# Patient Record
Sex: FEMALE | Race: BLACK OR AFRICAN AMERICAN | NOT HISPANIC OR LATINO | Employment: FULL TIME | URBAN - METROPOLITAN AREA
[De-identification: names, ages, dates, MRNs, and addresses within clinical notes are randomized per-mention and may not be internally consistent; named-entity substitution may affect disease eponyms.]

---

## 2023-04-19 ENCOUNTER — APPOINTMENT (OUTPATIENT)
Dept: LAB | Facility: LAB | Age: 53
End: 2023-04-19
Payer: COMMERCIAL

## 2023-04-19 LAB
ALBUMIN (G/DL) IN SER/PLAS: 4.3 G/DL (ref 3.4–5)
ANION GAP IN SER/PLAS: 13 MMOL/L (ref 10–20)
CALCIUM (MG/DL) IN SER/PLAS: 10 MG/DL (ref 8.6–10.6)
CARBON DIOXIDE, TOTAL (MMOL/L) IN SER/PLAS: 27 MMOL/L (ref 21–32)
CHLORIDE (MMOL/L) IN SER/PLAS: 109 MMOL/L (ref 98–107)
CREATININE (MG/DL) IN SER/PLAS: 0.81 MG/DL (ref 0.5–1.05)
ERYTHROCYTE DISTRIBUTION WIDTH (RATIO) BY AUTOMATED COUNT: 13.9 % (ref 11.5–14.5)
ERYTHROCYTE MEAN CORPUSCULAR HEMOGLOBIN CONCENTRATION (G/DL) BY AUTOMATED: 32 G/DL (ref 32–36)
ERYTHROCYTE MEAN CORPUSCULAR VOLUME (FL) BY AUTOMATED COUNT: 84 FL (ref 80–100)
ERYTHROCYTES (10*6/UL) IN BLOOD BY AUTOMATED COUNT: 5.53 X10E12/L (ref 4–5.2)
FERRITIN (UG/LL) IN SER/PLAS: 72 UG/L (ref 8–150)
GFR FEMALE: 86 ML/MIN/1.73M2
GLUCOSE (MG/DL) IN SER/PLAS: 87 MG/DL (ref 74–99)
HEMATOCRIT (%) IN BLOOD BY AUTOMATED COUNT: 46.5 % (ref 36–46)
HEMOGLOBIN (G/DL) IN BLOOD: 14.9 G/DL (ref 12–16)
IRON (UG/DL) IN SER/PLAS: 74 UG/DL (ref 35–150)
IRON BINDING CAPACITY (UG/DL) IN SER/PLAS: 339 UG/DL (ref 240–445)
IRON SATURATION (%) IN SER/PLAS: 22 % (ref 25–45)
LEUKOCYTES (10*3/UL) IN BLOOD BY AUTOMATED COUNT: 13.5 X10E9/L (ref 4.4–11.3)
NATRIURETIC PEPTIDE B (PG/ML) IN SER/PLAS: 25 PG/ML (ref 0–99)
NRBC (PER 100 WBCS) BY AUTOMATED COUNT: 0 /100 WBC (ref 0–0)
PHOSPHATE (MG/DL) IN SER/PLAS: 4.4 MG/DL (ref 2.5–4.9)
PLATELETS (10*3/UL) IN BLOOD AUTOMATED COUNT: 336 X10E9/L (ref 150–450)
POTASSIUM (MMOL/L) IN SER/PLAS: 4.6 MMOL/L (ref 3.5–5.3)
SODIUM (MMOL/L) IN SER/PLAS: 144 MMOL/L (ref 136–145)
UREA NITROGEN (MG/DL) IN SER/PLAS: 19 MG/DL (ref 6–23)

## 2023-10-09 ENCOUNTER — PHARMACY VISIT (OUTPATIENT)
Dept: PHARMACY | Facility: CLINIC | Age: 53
End: 2023-10-09
Payer: COMMERCIAL

## 2023-10-09 DIAGNOSIS — I50.30 HEART FAILURE WITH PRESERVED EJECTION FRACTION, UNSPECIFIED HF CHRONICITY (MULTI): Primary | ICD-10-CM

## 2023-10-11 ENCOUNTER — PHARMACY VISIT (OUTPATIENT)
Dept: PHARMACY | Facility: CLINIC | Age: 53
End: 2023-10-11
Payer: COMMERCIAL

## 2023-10-11 RX ORDER — CARVEDILOL 25 MG/1
25 TABLET ORAL 2 TIMES DAILY
Qty: 60 TABLET | Refills: 3 | Status: SHIPPED | OUTPATIENT
Start: 2023-10-11 | End: 2024-03-11 | Stop reason: SDUPTHER

## 2023-11-13 ENCOUNTER — PHARMACY VISIT (OUTPATIENT)
Dept: PHARMACY | Facility: CLINIC | Age: 53
End: 2023-11-13
Payer: COMMERCIAL

## 2023-11-13 PROCEDURE — RXMED WILLOW AMBULATORY MEDICATION CHARGE

## 2023-11-21 ENCOUNTER — TELEPHONE (OUTPATIENT)
Dept: OBSTETRICS AND GYNECOLOGY | Facility: HOSPITAL | Age: 53
End: 2023-11-21
Payer: COMMERCIAL

## 2023-11-21 DIAGNOSIS — R92.8 ABNORMAL MAMMOGRAM: Primary | ICD-10-CM

## 2023-11-21 NOTE — TELEPHONE ENCOUNTER
Received call from radiology tech.   Patient needs order for 6 month flow up right breast US.   Order pended and sent to provider to sign.

## 2023-11-29 ENCOUNTER — HOSPITAL ENCOUNTER (OUTPATIENT)
Dept: RADIOLOGY | Facility: HOSPITAL | Age: 53
Discharge: HOME | End: 2023-11-29
Payer: COMMERCIAL

## 2023-11-29 DIAGNOSIS — R92.8 ABNORMAL MAMMOGRAM: ICD-10-CM

## 2023-11-29 PROCEDURE — 76642 ULTRASOUND BREAST LIMITED: CPT | Mod: RT

## 2023-11-29 PROCEDURE — 76642 ULTRASOUND BREAST LIMITED: CPT | Mod: RIGHT SIDE | Performed by: RADIOLOGY

## 2023-11-29 PROCEDURE — 76982 USE 1ST TARGET LESION: CPT | Mod: RT

## 2023-12-08 DIAGNOSIS — R92.8 ABNORMAL MAMMOGRAM: Primary | ICD-10-CM

## 2023-12-08 PROCEDURE — RXMED WILLOW AMBULATORY MEDICATION CHARGE

## 2023-12-12 ENCOUNTER — TELEPHONE (OUTPATIENT)
Dept: OBSTETRICS AND GYNECOLOGY | Facility: CLINIC | Age: 53
End: 2023-12-12
Payer: COMMERCIAL

## 2023-12-12 NOTE — TELEPHONE ENCOUNTER
Called pt, no answer, left voicemail for return call  Left message explaining that the referral is to the breast center not to the actual breast surgeon

## 2023-12-12 NOTE — TELEPHONE ENCOUNTER
----- Message from Desean Murguia sent at 12/12/2023  9:44 AM EST -----  Regarding: breast ultrasound  Contact: [phone]  Hi do you know why I'm being  referred to breast surgeon.  thanks

## 2023-12-13 ENCOUNTER — TELEPHONE (OUTPATIENT)
Dept: OBSTETRICS AND GYNECOLOGY | Facility: HOSPITAL | Age: 53
End: 2023-12-13
Payer: COMMERCIAL

## 2023-12-13 NOTE — TELEPHONE ENCOUNTER
Verified patient by name and .   Returning patient's call  Patient had questions regarding referral to breast surgeon.  Discussed with patient this is due to her mammogram showing right breast mass and needing continual follow up.  Educated the patient that anytime someone has more complex breast finding on mammogram their provider who ordered mammogram will refer to breast center/surgeon.  Patient verbalized understanding and all questions and concerns addressed.

## 2023-12-18 ENCOUNTER — PHARMACY VISIT (OUTPATIENT)
Dept: PHARMACY | Facility: CLINIC | Age: 53
End: 2023-12-18
Payer: COMMERCIAL

## 2023-12-20 ENCOUNTER — APPOINTMENT (OUTPATIENT)
Dept: SURGICAL ONCOLOGY | Facility: CLINIC | Age: 53
End: 2023-12-20
Payer: COMMERCIAL

## 2024-01-02 PROBLEM — R92.8 ABNORMAL FINDING ON BREAST IMAGING: Status: ACTIVE | Noted: 2024-01-02

## 2024-01-02 NOTE — PROGRESS NOTES
[facility]  Desean Murguia female   1970 53 y.o.   [id number]      Chief Complaint  New patient, follow up abnormal mammogram.    History Of Present Illness  Desean Murguia is a 53 y.o. female presenting to the breast center with probably benign right breast masses. She denies breast surgery or biopsy. She has no family history of breast cancer.     BREAST IMAGIN2023 Right breast ultrasound, indicates BI-RADS Category 3. Stable probably benign right breast masses at 9:00 and 12:00. The mass at 9:00 may represent a complicated cyst or cyst cluster. Recommendation is for a six-month follow-up ultrasound of both masses coinciding with the patient's next annual bilateral mammogram. This  will be the final follow-up for the 12 o'clock mass and the 2nd for the 9 o'clock mass.      REPRODUCTIVE HISTORY: menarche age, GP, first birth age, , OCP's, premenopausal, LMP, heterogeneously dense                              FAMILY CANCER HISTORY:      Review of Systems  Constitutional:  Negative for appetite change, fatigue, fever and unexpected weight change.   HENT:  Negative for ear pain, hearing loss, nosebleeds, sore throat and trouble swallowing.    Eyes:  Negative for discharge, itching and visual disturbance.   Breast: As stated in HPI.  Respiratory:  Negative for cough, chest tightness and shortness of breath.    Cardiovascular:  Negative for chest pain, palpitations and leg swelling.   Gastrointestinal:  Negative for abdominal pain, constipation, diarrhea and nausea.   Endocrine: Negative for cold intolerance and heat intolerance.   Genitourinary:  Negative for dysuria, frequency, hematuria, pelvic pain and vaginal bleeding.   Musculoskeletal:  Negative for arthralgias, back pain, gait problem, joint swelling and myalgias.   Skin:  Negative for color change and rash.   Allergic/Immunologic: Negative for environmental allergies and food allergies.   Neurological:  Negative for dizziness,  tremors, speech difficulty, weakness, numbness and headaches.   Hematological:  Does not bruise/bleed easily.   Psychiatric/Behavioral:  Negative for agitation, dysphoric mood and sleep disturbance. The patient is not nervous/anxious.      Past Medical History  She has a past medical history of Encounter for general adult medical examination without abnormal findings, Other abnormal and inconclusive findings on diagnostic imaging of breast, Other conditions influencing health status, Other conditions influencing health status, Other conditions influencing health status, and Personal history of other diseases of the circulatory system (06/11/2018).    Surgical History  She has no past surgical history on file.    Family History  Cancer-related family history is not on file.     Social History  She has no history on file for tobacco use, alcohol use, and drug use.    Allergies  Patient has no allergy information on record.    Medications  Current Outpatient Medications   Medication Instructions    amoxicillin (Amoxil) 500 mg capsule TAKE 1 CAPSULE BY MOUTH THREE TIMES A DAY UNTIL GONE    carvedilol (Coreg) 25 mg tablet TAKE 1 TABLET BY MOUTH TWO TIMES A DAY    dapagliflozin propanediol (Farxiga) 10 mg TAKE 1 TABLET BY MOUTH ONCE DAILY    nicotine (Nicoderm CQ) 21 mg/24 hr patch APPLY 1 PATCH DAILY AS DIRECTED.    nicotine polacrilex (Nicorette) 4 mg gum CHEW 1 PIECE SLOWLY AND INTERMITTENTLY FOR 30 MINUTES. REPEAT EVERY 1-2 HOURS; MAXIMUM OF 24 PIECES/DAY    sacubitriL-valsartan (Entresto)  mg tablet TAKE 1 TABLET BY MOUTH TWO TIMES A DAY       Last Recorded Vitals  There were no vitals filed for this visit.     Physical Exam  Physical Exam  Patient is alert and oriented x3 and in a relaxed and appropriate mood. Her gait is steady and hand grasps are equal. Sclera is clear. The breasts are nearly symmetrical. The tissue is soft without palpable abnormalities, discrete nodules or masses. The skin and nipples  appear normal. There is no cervical, supraclavicular or axillary lymphadenopathy. Heart rate and rhythm normal, S1 and S2 appreciated. The lungs are clear to auscultation bilaterally. Abdomen is soft and non-tender.     Relevant Results and Imaging  Study Result    Narrative & Impression   Interpreted By:  Rosanne Rae and Avery Ross   STUDY:  BI US BREAST LIMITED RIGHT;  11/29/2023 8:56 am      ACCESSION NUMBER(S):  [id number]      ORDERING CLINICIAN:  EUGENIO TRINIDAD      INDICATION:  Short-term follow-up of two probably benign right breast masses.      COMPARISON:  Mammogram and ultrasound 05/18/2023 and 04/22/2022.      FINDINGS:  A targeted ultrasound of the right breast was performed by a  registered sonographer using elastography.      An oval parallel circumscribed hypoechoic mass is seen at the 12  o'clock position 5 cm from the nipple. The mass measures 0.8 x 0.4 x  1.0 cm compared to 0.8 x 0.5 x 1.0 cm. It is avascular and soft on  elastography.      An oval parallel circumscribed hypoechoic mass with septations is  visualized at the 9 o'clock position 5 cm from the nipple. The mass  measures 0.8 x 0.4 x 0.5 cm compared to 0.8 x 0.4 x 0.7 cm. It is  avascular and soft on elastography.      IMPRESSION:  Stable probably benign right breast masses at 9:00 and 12:00. The  mass at 9:00 may represent a complicated cyst or cyst cluster.  Recommendation is for a six-month follow-up ultrasound of both masses  coinciding with the patient's next annual bilateral mammogram. This  will be the final follow-up for the 12 o'clock mass and the 2nd for  the 9 o'clock mass.      BI-RADS CATEGORY:      BI-RADS Category:  3 Probably Benign.  Recommendation:  Short Interval Follow-up.  Recommended Date:  6 Months.  Laterality:  Right.      For any future breast imaging appointments, please call [phone]).      I personally reviewed the images/study and I agree with the findings  as stated by fellow physician, Dr. Bae  Rudolph.          MACRO:  None      Signed by: Rosanne Rae 11/29/2023 11:04 AM     Time was spent viewing digital images. I explained the results in depth, along with suggested explanation for follow up recommendations based on the testing results. BI-RADS Category 3    Orders  No orders of the defined types were placed in this encounter.      Visit Diagnosis  1. Abnormal finding on breast imaging          Assessment/Plan  Abnormal mammogram, right breast masses, no breast surgery or biopsy, no family history of breast cancer, heterogeneously dense    Plan:  Return in May 2024 for bilateral diagnostic mammogram with right breast ultrasound and office visit.     Patient Discussion/Summary  Your breast imaging was a BI-RADS category 3. This means the findings on imaging are probably benign, but still require short term follow up to monitor stability. The chances of the findings in this category being a cancer are extremely low, less than 1-2%. You will likely need repeat follow up imaging in 6-12 month intervals for up to 2 years until the findings are known to be stable or resolve. This eliminates unnecessary biopsies and allows for early diagnosis should the area change over time.    Your clinical exam was stable. Please return in May 2024 for follow up imaging and exam, or sooner if you have any problems or concerns.        IMPORTANT INFORMATION REGARDING YOUR RESULTS    You can see your health information, review clinical summaries from office visits & test results online when you follow your health with MY  Chart, a personal health record. To sign up go to [email or web address]. If you need assistance with signing up or trouble getting into your account call [facility] Patient Line 24/7 at [phone].    My office phone number is [phone] if you need to get in touch with me or have additional questions or concerns. Thank you for choosing [facility] and trusting me as your healthcare provider. I  look forward to seeing you again at your next office visit. I am honored to be a provider on your health care team and I remain dedicated to helping you achieve your health goals.    You can see your health information, review clinical summaries from office visits & test results online when you follow your health with MY  Chart, a personal health record. To sign up go to [email or web address]. If you need assistance with signing up or trouble getting into your account call [facility] Patient Line 24/7 at [phone].    My office phone number is [phone] if you need to get in touch with me or have additional questions or concerns. Thank you for choosing [facility] and trusting me as your healthcare provider. I look forward to seeing you again at your next office visit. I am honored to be a provider on your health care team and I remain dedicated to helping you achieve your health goals.    Юлия Chery, APRN-CNP

## 2024-01-04 ENCOUNTER — APPOINTMENT (OUTPATIENT)
Dept: SURGICAL ONCOLOGY | Facility: HOSPITAL | Age: 54
End: 2024-01-04
Payer: COMMERCIAL

## 2024-01-08 ENCOUNTER — OFFICE VISIT (OUTPATIENT)
Dept: SURGICAL ONCOLOGY | Facility: HOSPITAL | Age: 54
End: 2024-01-08
Payer: COMMERCIAL

## 2024-01-08 VITALS
DIASTOLIC BLOOD PRESSURE: 69 MMHG | WEIGHT: 138.4 LBS | TEMPERATURE: 97.2 F | SYSTOLIC BLOOD PRESSURE: 105 MMHG | HEART RATE: 95 BPM | BODY MASS INDEX: 23.03 KG/M2

## 2024-01-08 DIAGNOSIS — R92.8 ABNORMAL MAMMOGRAM: ICD-10-CM

## 2024-01-08 DIAGNOSIS — R92.8 ABNORMAL FINDING ON BREAST IMAGING: Primary | ICD-10-CM

## 2024-01-08 PROCEDURE — 99213 OFFICE O/P EST LOW 20 MIN: CPT | Performed by: NURSE PRACTITIONER

## 2024-01-08 PROCEDURE — 99203 OFFICE O/P NEW LOW 30 MIN: CPT | Performed by: NURSE PRACTITIONER

## 2024-01-08 ASSESSMENT — PAIN SCALES - GENERAL: PAINLEVEL: 0-NO PAIN

## 2024-01-08 NOTE — PROGRESS NOTES
[facility]  Desean Murguia female   1970 53 y.o.   [id number]        Chief Complaint  New patient, follow up abnormal mammogram.     History Of Present Illness  Desean Murguia is a very pleasant 53 y.o. AA female presenting to the breast center with probably benign right breast masses. She works here at . She denies breast surgery or biopsy. She has family history of breast cancer in her maternal grandmother, age 50.     BREAST IMAGIN2023 Right breast ultrasound, indicates BI-RADS Category 3. Stable probably benign right breast masses at 9:00 and 12:00. The mass at 9:00 may represent a complicated cyst or cyst cluster. Recommendation is for a six-month follow-up ultrasound of both masses coinciding with the patient's next annual bilateral mammogram. This  will be the final follow-up for the 12 o'clock mass and the 2nd for the 9 o'clock mass.      REPRODUCTIVE HISTORY: menarche age 10,  first birth age 17, did not breastfeed, OCP's x 1 year, natural menopause, age 44, no HRT, heterogeneously dense                              FAMILY CANCER HISTORY:   Maternal Grandmother: Breast cancer, age 50  Mother: Colon cancer, age 50     Review of Systems  Constitutional:  Negative for appetite change, fatigue, fever and unexpected weight change.   HENT:  Negative for ear pain, hearing loss, nosebleeds, sore throat and trouble swallowing.    Eyes:  Negative for discharge, itching and visual disturbance.   Breast: As stated in HPI.  Respiratory:  Negative for cough, chest tightness and shortness of breath.    Cardiovascular:  Negative for chest pain, palpitations and leg swelling.   Gastrointestinal:  Negative for abdominal pain, constipation, diarrhea and nausea.   Endocrine: Negative for cold intolerance and heat intolerance.   Genitourinary:  Negative for dysuria, frequency, hematuria, pelvic pain and vaginal bleeding.   Musculoskeletal:  Negative for arthralgias, back pain, gait problem, joint  swelling and myalgias.   Skin:  Negative for color change and rash.   Allergic/Immunologic: Negative for environmental allergies and food allergies.   Neurological:  Negative for dizziness, tremors, speech difficulty, weakness, numbness and headaches.   Hematological:  Does not bruise/bleed easily.   Psychiatric/Behavioral:  Negative for agitation, dysphoric mood and sleep disturbance. The patient is not nervous/anxious.       Past Medical History  She has a past medical history of Encounter for general adult medical examination without abnormal findings, Other abnormal and inconclusive findings on diagnostic imaging of breast, Other conditions influencing health status, Other conditions influencing health status, Other conditions influencing health status, and Personal history of other diseases of the circulatory system (06/11/2018).     Surgical History  She has no past surgical history on file.     Family History  Cancer-related family history is not on file.     Social History  Social History     Tobacco Use    Smoking status: Every Day     Packs/day: 1     Types: Cigarettes    Smokeless tobacco: Not on file   Substance Use Topics    Alcohol use: Not on file       Allergies  No Known Allergies     Medications       Current Outpatient Medications   Medication Instructions    amoxicillin (Amoxil) 500 mg capsule TAKE 1 CAPSULE BY MOUTH THREE TIMES A DAY UNTIL GONE    carvedilol (Coreg) 25 mg tablet TAKE 1 TABLET BY MOUTH TWO TIMES A DAY    dapagliflozin propanediol (Farxiga) 10 mg TAKE 1 TABLET BY MOUTH ONCE DAILY    nicotine (Nicoderm CQ) 21 mg/24 hr patch APPLY 1 PATCH DAILY AS DIRECTED.    nicotine polacrilex (Nicorette) 4 mg gum CHEW 1 PIECE SLOWLY AND INTERMITTENTLY FOR 30 MINUTES. REPEAT EVERY 1-2 HOURS; MAXIMUM OF 24 PIECES/DAY    sacubitriL-valsartan (Entresto)  mg tablet TAKE 1 TABLET BY MOUTH TWO TIMES A DAY       Last Recorded Vitals  Blood pressure 105/69, pulse 95, temperature 36.2 °C (97.2 °F),  weight 62.8 kg (138 lb 6.4 oz).     Physical Exam  Patient is alert and oriented x3 and in a relaxed and appropriate mood. Her gait is steady and hand grasps are equal. Sclera is clear. The breasts are nearly symmetrical. The tissue is dense in the superior lateral quadrants without palpable abnormalities, discrete nodules or masses. The skin and nipples appear normal. There is no cervical, supraclavicular or axillary lymphadenopathy. Heart rate and rhythm normal, S1 and S2 appreciated. The lungs are clear to auscultation bilaterally. Abdomen is soft and non-tender.      Relevant Results and Imaging  Study Result     Narrative & Impression   Interpreted By:  Rosanne Rae and Avery Ross   STUDY:  BI US BREAST LIMITED RIGHT;  11/29/2023 8:56 am      ACCESSION NUMBER(S):  [id number]      ORDERING CLINICIAN:  EUGENIO TRINIDAD      INDICATION:  Short-term follow-up of two probably benign right breast masses.      COMPARISON:  Mammogram and ultrasound 05/18/2023 and 04/22/2022.      FINDINGS:  A targeted ultrasound of the right breast was performed by a  registered sonographer using elastography.      An oval parallel circumscribed hypoechoic mass is seen at the 12  o'clock position 5 cm from the nipple. The mass measures 0.8 x 0.4 x  1.0 cm compared to 0.8 x 0.5 x 1.0 cm. It is avascular and soft on  elastography.      An oval parallel circumscribed hypoechoic mass with septations is  visualized at the 9 o'clock position 5 cm from the nipple. The mass  measures 0.8 x 0.4 x 0.5 cm compared to 0.8 x 0.4 x 0.7 cm. It is  avascular and soft on elastography.      IMPRESSION:  Stable probably benign right breast masses at 9:00 and 12:00. The  mass at 9:00 may represent a complicated cyst or cyst cluster.  Recommendation is for a six-month follow-up ultrasound of both masses  coinciding with the patient's next annual bilateral mammogram. This  will be the final follow-up for the 12 o'clock mass and the 2nd for  the 9 o'clock  mass.      BI-RADS CATEGORY:      BI-RADS Category:  3 Probably Benign.  Recommendation:  Short Interval Follow-up.  Recommended Date:  6 Months.  Laterality:  Right.      For any future breast imaging appointments, please call [phone]).      I personally reviewed the images/study and I agree with the findings  as stated by fellow physician, Dr. Catalino Galdamez.          MACRO:  None      Signed by: Rosanne Rae 11/29/2023 11:04 AM      Time was spent viewing digital images. I explained the results in depth, along with suggested explanation for follow up recommendations based on the testing results. BI-RADS Category 3     Orders  Orders Placed This Encounter   Procedures    BI mammo bilateral diagnostic tomosynthesis     Due June 13 2024     Standing Status:   Future     Standing Expiration Date:   2/8/2025     Order Specific Question:   Reason for exam:     Answer:   6 month follow up right breast mass     Order Specific Question:   Radiologist to Determine Optimal Study     Answer:   Yes     Order Specific Question:   Release result to [facility]     Answer:   Immediate [1]     Order Specific Question:   Is this exam part of a Research Study? If Yes, link this order to the research study     Answer:   No     Order Specific Question:   Is the patient pregnant?     Answer:   No    BI US breast limited right     Standing Status:   Future     Standing Expiration Date:   3/8/2025     Order Specific Question:   Reason for exam:     Answer:   6 month follow up right breast mass     Order Specific Question:   Radiologist to Determine Optimal Study     Answer:   Yes     Order Specific Question:   Release result to MyChart     Answer:   Immediate     Order Specific Question:   Is this exam part of a Research Study? If Yes, link this order to the research study     Answer:   No          Visit Diagnosis  1. Abnormal finding on breast imaging             Assessment/Plan  Abnormal mammogram, right breast masses, no breast  surgery or biopsy, family history of breast cancer, heterogeneously dense     Plan:  Return in May 2024 for bilateral diagnostic mammogram with right breast ultrasound and office visit.      Patient Discussion/Summary  Your breast imaging was a BI-RADS category 3. This means the findings on imaging are probably benign, but still require short term follow up to monitor stability. The chances of the findings in this category being a cancer are extremely low, less than 1-2%. You will likely need repeat follow up imaging in 6-12 month intervals for up to 2 years until the findings are known to be stable or resolve. This eliminates unnecessary biopsies and allows for early diagnosis should the area change over time.     Your clinical exam was stable. Please return in May 2024 for follow up imaging and exam, or sooner if you have any problems or concerns.         IMPORTANT INFORMATION REGARDING YOUR RESULTS     You can see your health information, review clinical summaries from office visits & test results online when you follow your health with MY  Chart, a personal health record. To sign up go to [email or web address]. If you need assistance with signing up or trouble getting into your account call [facility] Patient Line 24/7 at [phone].     My office phone number is [phone] if you need to get in touch with me or have additional questions or concerns. Thank you for choosing [facility] and trusting me as your healthcare provider. I look forward to seeing you again at your next office visit. I am honored to be a provider on your health care team and I remain dedicated to helping you achieve your health goals.    Юлия Chery, ANASTACIA-CNP

## 2024-01-10 ENCOUNTER — PATIENT OUTREACH (OUTPATIENT)
Dept: CARE COORDINATION | Facility: CLINIC | Age: 54
End: 2024-01-10
Payer: COMMERCIAL

## 2024-01-10 ENCOUNTER — DOCUMENTATION (OUTPATIENT)
Dept: CARE COORDINATION | Facility: CLINIC | Age: 54
End: 2024-01-10
Payer: COMMERCIAL

## 2024-01-10 NOTE — PROGRESS NOTES
[facility] Outreach call to offer assistance in re connecting with PCP or establish care with new PCP. Assist in gap closure. Left message requesting return call.

## 2024-01-11 ENCOUNTER — APPOINTMENT (OUTPATIENT)
Dept: SURGICAL ONCOLOGY | Facility: HOSPITAL | Age: 54
End: 2024-01-11
Payer: COMMERCIAL

## 2024-02-05 ENCOUNTER — CLINICAL SUPPORT (OUTPATIENT)
Dept: CARDIAC REHAB | Facility: HOSPITAL | Age: 54
End: 2024-02-05
Payer: COMMERCIAL

## 2024-02-05 DIAGNOSIS — F17.200 TOBACCO USE DISORDER: ICD-10-CM

## 2024-02-05 PROCEDURE — 99407 BEHAV CHNG SMOKING > 10 MIN: CPT | Performed by: INTERNAL MEDICINE

## 2024-02-07 NOTE — PROGRESS NOTES
Tobacco Cessation Counseling Session Note    Name: Desean Murguia            MRN: [id number]          YOB: 1970           Age: 53 y.o.                  Today’s Date: 2/7/2024  Primary Care Physician: Gloria Ordonez MD  Program Location[place] [facility] [id number] [place] [address]         Start time: 10:18 AM   End time: 10:44 AM      Desean Murguia presents for initial session for Tobacco Treatment Counseling. Patient currently smoking > 1/2 PPD and has been smoking since the age of 20. Patient has a high dependence on nicotine according to the [employer] nicotine dependence assessment. At this time, patient is motivated to quit smoking due to health concerns. See below for detailed assessment of tobacco use and treatment plan.      Smoking triggers/routines:  driving, AM routine (drinking coffee, curling her hair, etc.), after eating, work breaks, stress/anxiety     Past quit attempts:  None    Potential barriers to quitting:  high dependence    Strengths:  has been thinking about quitting for a while, most of her friends and family do not smoke, does not live with anyone else who smokes    Medication plan:  21 mg nicotine patches + 4 mg nicotine gum    Coping strategies:  distraction techniques, subs/replacements, stress management    Next steps:  keep a cigarette log for one week, start on reduction schedule, goal <= 10 CPD  -TQD TBD    Follow-up scheduled on 2/19/24 to evaluate progress and make any necessary changes to quit plan. Patient to call office at [phone] with any questions/concerns.      Tobacco Treatment Staff Signature- Lily Velez RN

## 2024-02-19 ENCOUNTER — APPOINTMENT (OUTPATIENT)
Dept: CARDIAC REHAB | Facility: HOSPITAL | Age: 54
End: 2024-02-19
Payer: COMMERCIAL

## 2024-02-26 ENCOUNTER — APPOINTMENT (OUTPATIENT)
Dept: CARDIAC REHAB | Facility: HOSPITAL | Age: 54
End: 2024-02-26
Payer: COMMERCIAL

## 2024-03-04 ENCOUNTER — APPOINTMENT (OUTPATIENT)
Dept: CARDIAC REHAB | Facility: HOSPITAL | Age: 54
End: 2024-03-04
Payer: COMMERCIAL

## 2024-03-11 DIAGNOSIS — I50.30 HEART FAILURE WITH PRESERVED EJECTION FRACTION, UNSPECIFIED HF CHRONICITY (MULTI): ICD-10-CM

## 2024-03-13 PROCEDURE — RXMED WILLOW AMBULATORY MEDICATION CHARGE

## 2024-03-13 RX ORDER — CARVEDILOL 25 MG/1
25 TABLET ORAL 2 TIMES DAILY
Qty: 180 TABLET | Refills: 0 | Status: SHIPPED | OUTPATIENT
Start: 2024-03-13 | End: 2024-06-12

## 2024-03-14 ENCOUNTER — PHARMACY VISIT (OUTPATIENT)
Dept: PHARMACY | Facility: CLINIC | Age: 54
End: 2024-03-14
Payer: COMMERCIAL

## 2024-03-18 ENCOUNTER — PHARMACY VISIT (OUTPATIENT)
Dept: PHARMACY | Facility: CLINIC | Age: 54
End: 2024-03-18
Payer: COMMERCIAL

## 2024-03-25 ENCOUNTER — LAB (OUTPATIENT)
Dept: LAB | Facility: LAB | Age: 54
End: 2024-03-25
Payer: COMMERCIAL

## 2024-03-25 ENCOUNTER — OFFICE VISIT (OUTPATIENT)
Dept: PRIMARY CARE | Facility: CLINIC | Age: 54
End: 2024-03-25
Payer: COMMERCIAL

## 2024-03-25 VITALS — WEIGHT: 140 LBS | BODY MASS INDEX: 23.3 KG/M2 | SYSTOLIC BLOOD PRESSURE: 131 MMHG | DIASTOLIC BLOOD PRESSURE: 73 MMHG

## 2024-03-25 DIAGNOSIS — Z72.0 TOBACCO USE: ICD-10-CM

## 2024-03-25 DIAGNOSIS — I50.22 CHF (CONGESTIVE HEART FAILURE), NYHA CLASS II, CHRONIC, SYSTOLIC (MULTI): ICD-10-CM

## 2024-03-25 DIAGNOSIS — I10 PRIMARY HYPERTENSION: ICD-10-CM

## 2024-03-25 DIAGNOSIS — Z12.11 ENCOUNTER FOR SCREENING FOR MALIGNANT NEOPLASM OF COLON: ICD-10-CM

## 2024-03-25 DIAGNOSIS — Z00.00 HEALTH CARE MAINTENANCE: Primary | ICD-10-CM

## 2024-03-25 DIAGNOSIS — E78.2 MIXED HYPERLIPIDEMIA: ICD-10-CM

## 2024-03-25 LAB
ANION GAP SERPL CALC-SCNC: 14 MMOL/L (ref 10–20)
BUN SERPL-MCNC: 15 MG/DL (ref 6–23)
CALCIUM SERPL-MCNC: 10.5 MG/DL (ref 8.6–10.6)
CHLORIDE SERPL-SCNC: 105 MMOL/L (ref 98–107)
CHOLEST SERPL-MCNC: 176 MG/DL (ref 0–199)
CHOLESTEROL/HDL RATIO: 3.7
CO2 SERPL-SCNC: 28 MMOL/L (ref 21–32)
CREAT SERPL-MCNC: 0.62 MG/DL (ref 0.5–1.05)
EGFRCR SERPLBLD CKD-EPI 2021: >90 ML/MIN/1.73M*2
ERYTHROCYTE [DISTWIDTH] IN BLOOD BY AUTOMATED COUNT: 13.2 % (ref 11.5–14.5)
GLUCOSE SERPL-MCNC: 94 MG/DL (ref 74–99)
HCT VFR BLD AUTO: 46.6 % (ref 36–46)
HDLC SERPL-MCNC: 47 MG/DL
HGB BLD-MCNC: 15.3 G/DL (ref 12–16)
LDLC SERPL CALC-MCNC: 113 MG/DL
MCH RBC QN AUTO: 27.6 PG (ref 26–34)
MCHC RBC AUTO-ENTMCNC: 32.8 G/DL (ref 32–36)
MCV RBC AUTO: 84 FL (ref 80–100)
NON HDL CHOLESTEROL: 129 MG/DL (ref 0–149)
NRBC BLD-RTO: 0 /100 WBCS (ref 0–0)
PLATELET # BLD AUTO: 246 X10*3/UL (ref 150–450)
POTASSIUM SERPL-SCNC: 4.8 MMOL/L (ref 3.5–5.3)
RBC # BLD AUTO: 5.54 X10*6/UL (ref 4–5.2)
SODIUM SERPL-SCNC: 142 MMOL/L (ref 136–145)
TRIGL SERPL-MCNC: 80 MG/DL (ref 0–149)
TSH SERPL-ACNC: 0.72 MIU/L (ref 0.44–3.98)
VLDL: 16 MG/DL (ref 0–40)
WBC # BLD AUTO: 13.1 X10*3/UL (ref 4.4–11.3)

## 2024-03-25 PROCEDURE — 36415 COLL VENOUS BLD VENIPUNCTURE: CPT

## 2024-03-25 PROCEDURE — 80048 BASIC METABOLIC PNL TOTAL CA: CPT

## 2024-03-25 PROCEDURE — 99203 OFFICE O/P NEW LOW 30 MIN: CPT | Performed by: INTERNAL MEDICINE

## 2024-03-25 PROCEDURE — 3075F SYST BP GE 130 - 139MM HG: CPT | Performed by: INTERNAL MEDICINE

## 2024-03-25 PROCEDURE — 84443 ASSAY THYROID STIM HORMONE: CPT

## 2024-03-25 PROCEDURE — 85027 COMPLETE CBC AUTOMATED: CPT

## 2024-03-25 PROCEDURE — 80061 LIPID PANEL: CPT

## 2024-03-25 PROCEDURE — 3078F DIAST BP <80 MM HG: CPT | Performed by: INTERNAL MEDICINE

## 2024-03-25 NOTE — PROGRESS NOTES
Subjective   Patient ID: Desean Murguia is a 53 y.o. female who presents for Annual Exam (Pt wants to establish care).    HPI met patient for first time see updated front sheet no chest pain no shortness of breath no polyuria polydipsia no side effect with medication not exercising much bones muscles joints okay except for right shoulder sometimes aching bowels normal no dysuria    Past medical history CHF hypertension    Medications noted and unchanged    Allergies no known drug allergies    Social history tobacco 1/2 pack/day alcohol none    Family history mother colon cancer    Prevention last colonoscopy about 15 years ago some prior blood work reviewed not much exercise    Review of Systems    Objective   There were no vitals taken for this visit.    Physical Exam vital signs noted alert and oriented x 3 NCAT PERRLA EOMI nares without discharge OP benign no lid lag no thyromegaly no JVD or bruit chest clear to auscultation CV regular rate and rhythm S1-S2 without murmur gallop or rub abdomen soft nontender normal active bowel sounds LS spine normal curvature negative straight leg raise extremities no clubbing cyanosis or edema normal distal pulses DTR 2+ msk right shoulder full rom nl hand strength    Assessment/Plan    impression CHF tobacco hypertension other diagnoses hyperlipidemia  Plan stop smoking altogether discussed with in detail check Chem-7 advised on glucose potassium and kidney function check CBC advised on blood count check lipid panel advised on cholesterol profile check TSH advised on thyroid good diet regular exercise good water consumption follow-up for breast health okay for colonoscopy requisition made follow-up with ophthalmology range of motion exercises for her shoulders especially the right there is no crepitus there the exam is relatively normal she may have had some films in the past Tylenol as needed avoid NSAIDs recheck 3 months based on labs TT 50 cc 26  Review echo  Review any  shoulder films (check)

## 2024-04-03 DIAGNOSIS — Z12.11 COLON CANCER SCREENING: ICD-10-CM

## 2024-04-03 PROCEDURE — RXMED WILLOW AMBULATORY MEDICATION CHARGE

## 2024-04-03 RX ORDER — POLYETHYLENE GLYCOL 3350, SODIUM SULFATE ANHYDROUS, SODIUM BICARBONATE, SODIUM CHLORIDE, POTASSIUM CHLORIDE 236; 22.74; 6.74; 5.86; 2.97 G/4L; G/4L; G/4L; G/4L; G/4L
4000 POWDER, FOR SOLUTION ORAL ONCE
Qty: 4000 ML | Refills: 0 | Status: SHIPPED | OUTPATIENT
Start: 2024-04-03 | End: 2024-04-05

## 2024-04-04 ENCOUNTER — PHARMACY VISIT (OUTPATIENT)
Dept: PHARMACY | Facility: CLINIC | Age: 54
End: 2024-04-04
Payer: COMMERCIAL

## 2024-05-24 NOTE — PROGRESS NOTES
Desean Murguia female   1970 53 y.o.   [id number]      Chief Complaint  Follow up abnormal mammogram    History Of Present Illness  Desean Murguia is a very pleasant 53 y.o. AAfemale presenting to the breast center with probably benign right breast masses. She works here at . She denies breast surgery or biopsy. She has family history of breast cancer in her maternal grandmother, age 50.     BREAST IMAGIN2022 bilateral screening mammogram, BI-RADS Category 0, RIGHT breast, masslike focal asymmetry. 2022 RIGHT diagnostic mammogram and ultrasound, BI-RADS Category 3, 12:00 5 cm from the nipple 1.0 x 0.4 x 1.1 cm, Probably benign probable right breast fibroadenoma at the site of the previously described right breast focal asymmetry. 10/25/2022 RIGHT breast ultrasound, BI-RADS Category 3, 12:00 5 cm from the nipple  0.8 x 0.4 x 1.1 cm Stable probably benign mass in the right breast. 2023 Bilateral diagnostic mammogram and right ultrasound, BI-RADS Category 3, RIGHT breast, 12:00 5 cm from nipple 1.0 x 0.8 x 0.5 cm, stable probably benign mass demonstrating 1 year stability. RIGHT 9:00 5 cm from the nipple,  0.8 x 0.7 x 0.4 cm right probably benign mass. 2023 RIGHT breast ultrasound, BI-RADS Category 3, 9:00 5 cm from the nipple, 0.8 x 0.4 x 0.5 cm, stable probably benign mass. This will be the final follow-up for the 12 o'clock mass and the 2nd for the 9 o'clock mass. 2024 Bilateral diagnostic mammogram and ultrasound, BI-RADS Category 2.       REPRODUCTIVE HISTORY: menarche age 10,  first birth age 17, did not breastfeed, OCP's x 1 year, natural menopause, age 44, no HRT, heterogeneously dense                                  FAMILY CANCER HISTORY:   Maternal Grandmother: Breast cancer, age 50  Mother: Colon cancer, age 50     Surgical History  She has no past surgical history on file.     Social History  She reports that she has been smoking cigarettes. She does not have any smokeless  tobacco history on file. She reports current alcohol use. No history on file for drug use.    Family History  No family history on file.     Allergies  Patient has no known allergies.    Medications  Current Outpatient Medications   Medication Instructions    amoxicillin (Amoxil) 500 mg capsule TAKE 1 CAPSULE BY MOUTH THREE TIMES A DAY UNTIL GONE    carvedilol (Coreg) 25 mg tablet TAKE 1 TABLET BY MOUTH TWO TIMES A DAY    dapagliflozin propanediol (Farxiga) 10 mg TAKE 1 TABLET BY MOUTH ONCE DAILY    nicotine (Nicoderm CQ) 21 mg/24 hr patch APPLY 1 PATCH DAILY AS DIRECTED.    nicotine polacrilex (Nicorette) 4 mg gum CHEW 1 PIECE SLOWLY AND INTERMITTENTLY FOR 30 MINUTES. REPEAT EVERY 1-2 HOURS; MAXIMUM OF 24 PIECES/DAY    sacubitriL-valsartan (Entresto)  mg tablet TAKE 1 TABLET BY MOUTH TWO TIMES A DAY         REVIEW OF SYSTEMS    Constitutional:  Negative for appetite change, fatigue, fever and unexpected weight change.   HENT:  Negative for ear pain, hearing loss, nosebleeds, sore throat and trouble swallowing.    Eyes:  Negative for discharge, itching and visual disturbance.   Respiratory:  Negative for cough, chest tightness and shortness of breath.    Cardiovascular:  Negative for chest pain, palpitations and leg swelling.   Breast: as indicated in HPI  Gastrointestinal:  Negative for abdominal pain, constipation, diarrhea and nausea.   Endocrine: Negative for cold intolerance and heat intolerance.   Genitourinary:  Negative for dysuria, frequency, hematuria, pelvic pain and vaginal bleeding.   Musculoskeletal:  Negative for arthralgias, back pain, gait problem, joint swelling and myalgias.   Skin:  Negative for color change and rash.   Allergic/Immunologic: Negative for environmental allergies and food allergies.   Neurological:  Negative for dizziness, tremors, speech difficulty, weakness, numbness and headaches.   Hematological:  Does not bruise/bleed easily.   Psychiatric/Behavioral:  Negative for  agitation, dysphoric mood and sleep disturbance. The patient is not nervous/anxious.         Past Medical History  She has a past medical history of Encounter for general adult medical examination without abnormal findings, Other abnormal and inconclusive findings on diagnostic imaging of breast, Other conditions influencing health status, Other conditions influencing health status, Other conditions influencing health status, and Personal history of other diseases of the circulatory system (06/11/2018).     Physical Exam  Patient is alert and oriented x3 and in a relaxed and appropriate mood. Her gait is steady and hand grasps are equal. Sclera is clear. The breasts are nearly symmetrical. The tissue is dense in the superior lateral quadrants without palpable abnormalities, discrete nodules or masses. The skin and nipples appear normal. There is no cervical, supraclavicular or axillary lymphadenopathy. Heart rate and rhythm normal, S1 and S2 appreciated. The lungs are clear to auscultation bilaterally. Abdomen is soft and non-tender.       Physical Exam     Last Recorded Vitals  Vitals:    05/31/24 1046   BP: 101/67   Pulse: 83   Resp: 17   Temp: 36.3 °C (97.3 °F)   SpO2: 94%       Relevant Results   Time was spent viewing digital images of the radiology testing with the patient. I explained the results in depth, along with suggested explanation for follow up recommendations based on the testing results. BI-RADS Category 2    Imaging  No results found for this or any previous visit from the past 365 days.       Assessment/Plan     Normal exam and imaging, right breast masses, no breast surgery or biopsy, family history of breast cancer, heterogeneously dense     Patient Discussion/Summary  Your clinical examination and imaging are normal. Please return in one year for bilateral screening mammogram and office visit or sooner if you have any problems or concerns.     You can see your health information, review  clinical summaries from office visits & test results online when you follow your health with MY  Chart, a personal health record. To sign up go to [email or web address]. If you need assistance with signing up or trouble getting into your account call [employer] Patient Line 24/7 at [phone].    My office phone number is [phone] if you need to get in touch with me or have additional questions or concerns. Thank you for choosing [facility] and trusting me as your healthcare provider. I look forward to seeing you again at your next office visit. I am honored to be a provider on your health care team and I remain dedicated to helping you achieve your health goals.      Adriana Roberson, ANASTACIA-CNP

## 2024-05-30 ENCOUNTER — APPOINTMENT (OUTPATIENT)
Dept: RADIOLOGY | Facility: HOSPITAL | Age: 54
End: 2024-05-30
Payer: COMMERCIAL

## 2024-05-30 ENCOUNTER — APPOINTMENT (OUTPATIENT)
Dept: SURGICAL ONCOLOGY | Facility: HOSPITAL | Age: 54
End: 2024-05-30
Payer: COMMERCIAL

## 2024-05-31 ENCOUNTER — HOSPITAL ENCOUNTER (OUTPATIENT)
Dept: RADIOLOGY | Facility: HOSPITAL | Age: 54
Discharge: HOME | End: 2024-05-31
Payer: COMMERCIAL

## 2024-05-31 ENCOUNTER — OFFICE VISIT (OUTPATIENT)
Dept: SURGICAL ONCOLOGY | Facility: HOSPITAL | Age: 54
End: 2024-05-31
Payer: COMMERCIAL

## 2024-05-31 VITALS — HEIGHT: 65 IN | WEIGHT: 139.99 LBS | BODY MASS INDEX: 23.32 KG/M2

## 2024-05-31 VITALS
HEART RATE: 83 BPM | SYSTOLIC BLOOD PRESSURE: 101 MMHG | OXYGEN SATURATION: 94 % | BODY MASS INDEX: 23.21 KG/M2 | HEIGHT: 65 IN | WEIGHT: 139.33 LBS | DIASTOLIC BLOOD PRESSURE: 67 MMHG | TEMPERATURE: 97.3 F | RESPIRATION RATE: 17 BRPM

## 2024-05-31 DIAGNOSIS — R92.8 ABNORMAL FINDING ON BREAST IMAGING: ICD-10-CM

## 2024-05-31 PROCEDURE — 76982 USE 1ST TARGET LESION: CPT | Mod: RT

## 2024-05-31 PROCEDURE — 99212 OFFICE O/P EST SF 10 MIN: CPT

## 2024-05-31 PROCEDURE — 77066 DX MAMMO INCL CAD BI: CPT | Performed by: STUDENT IN AN ORGANIZED HEALTH CARE EDUCATION/TRAINING PROGRAM

## 2024-05-31 PROCEDURE — 76642 ULTRASOUND BREAST LIMITED: CPT | Mod: RT

## 2024-05-31 PROCEDURE — 99213 OFFICE O/P EST LOW 20 MIN: CPT

## 2024-05-31 PROCEDURE — 77062 BREAST TOMOSYNTHESIS BI: CPT

## 2024-05-31 PROCEDURE — 77062 BREAST TOMOSYNTHESIS BI: CPT | Performed by: STUDENT IN AN ORGANIZED HEALTH CARE EDUCATION/TRAINING PROGRAM

## 2024-05-31 PROCEDURE — 76642 ULTRASOUND BREAST LIMITED: CPT | Performed by: STUDENT IN AN ORGANIZED HEALTH CARE EDUCATION/TRAINING PROGRAM

## 2024-05-31 ASSESSMENT — PAIN SCALES - GENERAL: PAINLEVEL: 0-NO PAIN

## 2024-06-04 ENCOUNTER — PHARMACY VISIT (OUTPATIENT)
Dept: PHARMACY | Facility: CLINIC | Age: 54
End: 2024-06-04
Payer: COMMERCIAL

## 2024-06-04 PROCEDURE — RXMED WILLOW AMBULATORY MEDICATION CHARGE

## 2024-06-04 RX ORDER — TRIAZOLAM 0.25 MG/1
TABLET ORAL
Qty: 2 TABLET | Refills: 0 | OUTPATIENT
Start: 2024-06-04

## 2024-06-06 ENCOUNTER — PHARMACY VISIT (OUTPATIENT)
Dept: PHARMACY | Facility: CLINIC | Age: 54
End: 2024-06-06
Payer: COMMERCIAL

## 2024-06-06 PROCEDURE — RXMED WILLOW AMBULATORY MEDICATION CHARGE

## 2024-06-06 RX ORDER — NAPROXEN 500 MG/1
TABLET ORAL
Qty: 30 TABLET | Refills: 0 | OUTPATIENT
Start: 2024-06-06

## 2024-07-12 ENCOUNTER — APPOINTMENT (OUTPATIENT)
Dept: GASTROENTEROLOGY | Facility: EXTERNAL LOCATION | Age: 54
End: 2024-07-12
Payer: COMMERCIAL

## 2024-07-12 DIAGNOSIS — D12.5 BENIGN NEOPLASM OF SIGMOID COLON: ICD-10-CM

## 2024-07-12 DIAGNOSIS — Z12.11 ENCOUNTER FOR SCREENING FOR MALIGNANT NEOPLASM OF COLON: ICD-10-CM

## 2024-07-12 DIAGNOSIS — K57.30 DIVERTICULOSIS OF LARGE INTESTINE WITHOUT DIVERTICULITIS: Primary | ICD-10-CM

## 2024-07-12 DIAGNOSIS — K64.8 INTERNAL HEMORRHOIDS: ICD-10-CM

## 2024-07-12 DIAGNOSIS — D12.2 BENIGN NEOPLASM OF ASCENDING COLON: ICD-10-CM

## 2024-07-12 PROCEDURE — 45385 COLONOSCOPY W/LESION REMOVAL: CPT | Performed by: INTERNAL MEDICINE

## 2024-07-12 PROCEDURE — 88305 TISSUE EXAM BY PATHOLOGIST: CPT

## 2024-07-12 PROCEDURE — 0753T DGTZ GLS MCRSCP SLD LEVEL IV: CPT

## 2024-07-15 ENCOUNTER — LAB REQUISITION (OUTPATIENT)
Dept: LAB | Facility: HOSPITAL | Age: 54
End: 2024-07-15
Payer: COMMERCIAL

## 2024-07-24 LAB
LABORATORY COMMENT REPORT: NORMAL
PATH REPORT.FINAL DX SPEC: NORMAL
PATH REPORT.GROSS SPEC: NORMAL
PATH REPORT.RELEVANT HX SPEC: NORMAL
PATH REPORT.TOTAL CANCER: NORMAL

## 2024-08-19 ENCOUNTER — HOSPITAL ENCOUNTER (OUTPATIENT)
Dept: RADIOLOGY | Facility: CLINIC | Age: 54
Discharge: HOME | End: 2024-08-19
Payer: COMMERCIAL

## 2024-08-19 DIAGNOSIS — S90.122A CONTUSION OF FOOT INCLUDING TOES, LEFT, INITIAL ENCOUNTER: ICD-10-CM

## 2024-08-19 DIAGNOSIS — S90.32XA CONTUSION OF FOOT INCLUDING TOES, LEFT, INITIAL ENCOUNTER: ICD-10-CM

## 2024-08-19 PROCEDURE — 73630 X-RAY EXAM OF FOOT: CPT | Mod: LT

## 2024-08-19 PROCEDURE — 73630 X-RAY EXAM OF FOOT: CPT | Mod: LEFT SIDE | Performed by: RADIOLOGY

## 2024-08-27 DIAGNOSIS — I42.8 NON-ISCHEMIC CARDIOMYOPATHY (MULTI): ICD-10-CM

## 2024-08-27 DIAGNOSIS — I50.30 HEART FAILURE WITH PRESERVED EJECTION FRACTION, UNSPECIFIED HF CHRONICITY (MULTI): Primary | ICD-10-CM

## 2024-08-28 PROCEDURE — RXMED WILLOW AMBULATORY MEDICATION CHARGE

## 2024-08-28 RX ORDER — DAPAGLIFLOZIN 10 MG/1
10 TABLET, FILM COATED ORAL DAILY
Qty: 30 TABLET | Refills: 1 | Status: SHIPPED | OUTPATIENT
Start: 2024-08-28 | End: 2024-10-27

## 2024-08-28 RX ORDER — SACUBITRIL AND VALSARTAN 97; 103 MG/1; MG/1
1 TABLET, FILM COATED ORAL 2 TIMES DAILY
Qty: 60 TABLET | Refills: 1 | Status: SHIPPED | OUTPATIENT
Start: 2024-08-28 | End: 2024-10-27

## 2024-08-30 ENCOUNTER — PHARMACY VISIT (OUTPATIENT)
Dept: PHARMACY | Facility: CLINIC | Age: 54
End: 2024-08-30
Payer: COMMERCIAL

## 2024-08-30 DIAGNOSIS — I50.30 HEART FAILURE WITH PRESERVED EJECTION FRACTION, UNSPECIFIED HF CHRONICITY (MULTI): ICD-10-CM

## 2024-08-30 PROCEDURE — RXMED WILLOW AMBULATORY MEDICATION CHARGE

## 2024-08-30 RX ORDER — CARVEDILOL 25 MG/1
25 TABLET ORAL 2 TIMES DAILY
Qty: 60 TABLET | Refills: 1 | Status: SHIPPED | OUTPATIENT
Start: 2024-08-30

## 2024-10-17 ENCOUNTER — LAB (OUTPATIENT)
Dept: LAB | Facility: LAB | Age: 54
End: 2024-10-17
Payer: COMMERCIAL

## 2024-10-17 ENCOUNTER — PHARMACY VISIT (OUTPATIENT)
Dept: PHARMACY | Facility: CLINIC | Age: 54
End: 2024-10-17
Payer: COMMERCIAL

## 2024-10-17 ENCOUNTER — OFFICE VISIT (OUTPATIENT)
Dept: CARDIOLOGY | Facility: HOSPITAL | Age: 54
End: 2024-10-17
Payer: COMMERCIAL

## 2024-10-17 VITALS
BODY MASS INDEX: 22.59 KG/M2 | OXYGEN SATURATION: 95 % | SYSTOLIC BLOOD PRESSURE: 122 MMHG | DIASTOLIC BLOOD PRESSURE: 74 MMHG | HEIGHT: 65 IN | WEIGHT: 135.6 LBS | HEART RATE: 91 BPM

## 2024-10-17 DIAGNOSIS — I42.8 NON-ISCHEMIC CARDIOMYOPATHY (MULTI): ICD-10-CM

## 2024-10-17 DIAGNOSIS — I50.30 HEART FAILURE WITH PRESERVED EJECTION FRACTION, UNSPECIFIED HF CHRONICITY: Primary | ICD-10-CM

## 2024-10-17 DIAGNOSIS — I50.30 HEART FAILURE WITH PRESERVED EJECTION FRACTION, UNSPECIFIED HF CHRONICITY: ICD-10-CM

## 2024-10-17 LAB
ALBUMIN SERPL BCP-MCNC: 4.6 G/DL (ref 3.4–5)
ANION GAP SERPL CALC-SCNC: 13 MMOL/L (ref 10–20)
BASOPHILS # BLD AUTO: 0.05 X10*3/UL (ref 0–0.1)
BASOPHILS NFR BLD AUTO: 0.4 %
BNP SERPL-MCNC: 21 PG/ML (ref 0–99)
BUN SERPL-MCNC: 15 MG/DL (ref 6–23)
CALCIUM SERPL-MCNC: 10.4 MG/DL (ref 8.6–10.6)
CHLORIDE SERPL-SCNC: 104 MMOL/L (ref 98–107)
CO2 SERPL-SCNC: 31 MMOL/L (ref 21–32)
CREAT SERPL-MCNC: 0.69 MG/DL (ref 0.5–1.05)
EGFRCR SERPLBLD CKD-EPI 2021: >90 ML/MIN/1.73M*2
EOSINOPHIL # BLD AUTO: 0.28 X10*3/UL (ref 0–0.7)
EOSINOPHIL NFR BLD AUTO: 2.1 %
ERYTHROCYTE [DISTWIDTH] IN BLOOD BY AUTOMATED COUNT: 14 % (ref 11.5–14.5)
GLUCOSE SERPL-MCNC: 95 MG/DL (ref 74–99)
HCT VFR BLD AUTO: 48.2 % (ref 36–46)
HGB BLD-MCNC: 15.2 G/DL (ref 12–16)
IMM GRANULOCYTES # BLD AUTO: 0.09 X10*3/UL (ref 0–0.7)
IMM GRANULOCYTES NFR BLD AUTO: 0.7 % (ref 0–0.9)
LYMPHOCYTES # BLD AUTO: 4.05 X10*3/UL (ref 1.2–4.8)
LYMPHOCYTES NFR BLD AUTO: 30.6 %
MCH RBC QN AUTO: 27 PG (ref 26–34)
MCHC RBC AUTO-ENTMCNC: 31.5 G/DL (ref 32–36)
MCV RBC AUTO: 86 FL (ref 80–100)
MONOCYTES # BLD AUTO: 1.26 X10*3/UL (ref 0.1–1)
MONOCYTES NFR BLD AUTO: 9.5 %
NEUTROPHILS # BLD AUTO: 7.49 X10*3/UL (ref 1.2–7.7)
NEUTROPHILS NFR BLD AUTO: 56.7 %
NRBC BLD-RTO: 0 /100 WBCS (ref 0–0)
PHOSPHATE SERPL-MCNC: 5.6 MG/DL (ref 2.5–4.9)
PLATELET # BLD AUTO: 328 X10*3/UL (ref 150–450)
POTASSIUM SERPL-SCNC: 4.3 MMOL/L (ref 3.5–5.3)
RBC # BLD AUTO: 5.64 X10*6/UL (ref 4–5.2)
SODIUM SERPL-SCNC: 144 MMOL/L (ref 136–145)
TSH SERPL-ACNC: 0.66 MIU/L (ref 0.44–3.98)
WBC # BLD AUTO: 13.2 X10*3/UL (ref 4.4–11.3)

## 2024-10-17 PROCEDURE — 85025 COMPLETE CBC W/AUTO DIFF WBC: CPT

## 2024-10-17 PROCEDURE — 3008F BODY MASS INDEX DOCD: CPT | Performed by: STUDENT IN AN ORGANIZED HEALTH CARE EDUCATION/TRAINING PROGRAM

## 2024-10-17 PROCEDURE — 99215 OFFICE O/P EST HI 40 MIN: CPT | Performed by: STUDENT IN AN ORGANIZED HEALTH CARE EDUCATION/TRAINING PROGRAM

## 2024-10-17 PROCEDURE — RXMED WILLOW AMBULATORY MEDICATION CHARGE

## 2024-10-17 PROCEDURE — 83880 ASSAY OF NATRIURETIC PEPTIDE: CPT

## 2024-10-17 PROCEDURE — 84443 ASSAY THYROID STIM HORMONE: CPT

## 2024-10-17 PROCEDURE — 36415 COLL VENOUS BLD VENIPUNCTURE: CPT

## 2024-10-17 PROCEDURE — 80069 RENAL FUNCTION PANEL: CPT

## 2024-10-17 RX ORDER — IBUPROFEN 200 MG
1 TABLET ORAL DAILY
Qty: 28 PATCH | Refills: 1 | Status: SHIPPED | OUTPATIENT
Start: 2024-10-17 | End: 2024-12-12

## 2024-10-17 RX ORDER — DAPAGLIFLOZIN 10 MG/1
10 TABLET, FILM COATED ORAL DAILY
Qty: 30 TABLET | Refills: 11 | Status: SHIPPED | OUTPATIENT
Start: 2024-10-17 | End: 2025-10-17

## 2024-10-17 RX ORDER — SACUBITRIL AND VALSARTAN 97; 103 MG/1; MG/1
1 TABLET, FILM COATED ORAL 2 TIMES DAILY
Qty: 60 TABLET | Refills: 11 | Status: SHIPPED | OUTPATIENT
Start: 2024-10-17 | End: 2025-10-17

## 2024-10-17 RX ORDER — CARVEDILOL 25 MG/1
25 TABLET ORAL 2 TIMES DAILY
Qty: 60 TABLET | Refills: 11 | Status: SHIPPED | OUTPATIENT
Start: 2024-10-17 | End: 2025-10-17

## 2024-10-17 RX ORDER — ASPIRIN/CALCIUM CARB/MAGNESIUM 325 MG
4 TABLET ORAL EVERY 2 HOUR PRN
Qty: 72 LOZENGE | Refills: 0 | Status: SHIPPED | OUTPATIENT
Start: 2024-10-17 | End: 2024-11-16

## 2024-10-17 ASSESSMENT — ENCOUNTER SYMPTOMS
NERVOUS/ANXIOUS: 0
DECREASED APPETITE: 0
PALPITATIONS: 0
SHORTNESS OF BREATH: 0
HEADACHES: 0
FALLS: 0
DYSURIA: 0
DIZZINESS: 0
SORE THROAT: 0
BACK PAIN: 0
ABDOMINAL PAIN: 0
NAUSEA: 0
WEIGHT LOSS: 0
DYSPNEA ON EXERTION: 0
WEIGHT GAIN: 0
BLURRED VISION: 0
ORTHOPNEA: 0
DOUBLE VISION: 0
CHANGE IN BOWEL HABIT: 0
DEPRESSION: 0
VOMITING: 0
COUGH: 0
COLOR CHANGE: 0
PND: 0
LIGHT-HEADEDNESS: 0

## 2024-10-17 ASSESSMENT — PAIN SCALES - GENERAL: PAINLEVEL_OUTOF10: 0-NO PAIN

## 2024-10-17 NOTE — PROGRESS NOTES
Referring Clinician: MADI Abarca  Accompanied by: alone today  Work Tel [phone]    Chief Complaint  Ambulatory heart failure care     History of Present Illness    54 y.o.  who presents for advanced heart failure care. She has a past medical history significant for HFrEF/nonischemic biventricular cardiomyopathy discovered on 2/2021 admission for tachycardia. On TTE 2/2021 LVEF 15% LVIDD 5.8 cm with decreased RV systolic function; on cardiac MRI 2/2021 LVEF 15% RVEF 21%. On LHC 2/2021 she had normal coronaries with LVEDP 5 mmHg. Her other comorbidities include sickle cell trait, active smoking history, past cocaine use, past marijuana use. Since her heart failure diagnosis she has been abstinent from cocaine and marijuana, and continues to smoke cigarettes.  Ms. Murguia was initiated on heart failure pharmacotherapy and has had complete normalization of her biventricular systolic function; on TTE 8/25/2021 LVEF 60-65% LVIDD 4.4 cm with normal right ventricular systolic function, RVSP 26 mmHg. TTE 1/2022 LVEF 60-65%. repeat TTE 3/2023 shows normal biV systolic function with LVEF 60%    During her stay at [facility] spironolactone was discontinued for hypotension, since then she has felt well off this medication. She continues to have a good exercise tolerance and can walk approximately 2 city blocks but does not have stairs in her everyday life to challenge.  For the last 3 months she has been partially nonadherent with her heart failure medications.  There is no real reason for this.  She has full access to her medications, and cannot afford them.  She has generally been feeling well and thinks that she became somewhat complacent.  She also gives a history of recent intermittent, described as infrequent exertional dyspnea when she walks around in her home.  Interestingly, when she walks outdoors or at work she is not limited by exertional dyspnea.  She continues to avoid stairs.    She is trying to get back  on track with her medication adherence.    She denies chest pain, SOB at rest,  orthopnoea, PND, bendopnoea, syncope, pre syncope, palpitations, or leg swelling.    Unfortunately she continues to smoke 1 PPD daily. No use of illicit drugs or ethanol.    She has not required an ED visit or hospitalization since her last visit.    She has completed cardiac rehabilitation.    Her last HF hospitalisation was 2/2021.    Surgical Hx:  -No    Past Obstetric Hx:  - P1  - No cardiac complications of pregnancy    Social Hx:  - Smoking-smoking 1 PPD, previously 1.5 PPD.  - ETOH-no longer drinking, previously heavy drinker on weekends  - Illicit drugs-last cocaine 1/31/2021, last marijuana end March 2021  - Lives  with partner and feels safe at home    Family Hx:  Specifically, there is no family history of CAD, heart failure, ICD, PPM, LVAD, OHT, arrhythmias, CVA, or sudden cardiac death.  Maternal relatives-multiple cancers (breast, colon) colon cancer  Mother-at age 50: Cancer  Father-? CHF ( unsure)  Son - HbSS  Medication reconciliation completed, see below.     Investigations:    The electronic medical record has been reviewed by me for salient history. All cardiovascular imaging and testing available in the electronic medical record, and [employer] has been reviewed.      Medication Documentation Review Audit       Reviewed by Noemi Layton RN (Registered Nurse) on 10/17/24 at 0824      Medication Order Taking? Sig Documenting Provider Last Dose Status   carvedilol (Coreg) 25 mg tablet [id number] Yes Take 1 tablet (25 mg) by mouth 2 times a day. Diane Graham MD PhD  Active   dapagliflozin propanediol (Farxiga) 10 mg [id number] Yes Take 1 tablet (10 mg) by mouth once daily. Please schedule a follow up with Dr. Graham at [phone] for further refills Diane Graham MD PhD  Active      Discontinued 10/17/24 0824   nicotine (Nicoderm CQ) 21 mg/24 hr patch [id number] No APPLY 1 PATCH DAILY AS DIRECTED.   Patient  not taking: Reported on 2024    Diane Graham MD PhD Not Taking Active   nicotine polacrilex (Nicorette) 4 mg gum [id number] No CHEW 1 PIECE SLOWLY AND INTERMITTENTLY FOR 30 MINUTES. REPEAT EVERY 1-2 HOURS; MAXIMUM OF 24 PIECES/DAY   Patient not taking: Reported on 2024    Diane Graham MD PhD Not Taking  24 2011   sacubitriL-valsartan (Entresto)  mg tablet [id number] Yes Take 1 tablet by mouth 2 times a day. Please schedule a follow up with Dr. Graham at [phone] for further refills Diane Graham MD PhD  Active      Discontinued 10/17/24 0824                   No Known Allergies    Review of Systems   Constitutional: Negative for decreased appetite, weight gain and weight loss.   HENT:  Negative for sore throat.    Eyes:  Negative for blurred vision and double vision.   Cardiovascular:  Negative for chest pain, dyspnea on exertion, leg swelling, orthopnea, palpitations and paroxysmal nocturnal dyspnea.   Respiratory:  Negative for cough and shortness of breath.    Skin:  Negative for color change, dry skin and itching.   Musculoskeletal:  Negative for back pain and falls.   Gastrointestinal:  Negative for abdominal pain, change in bowel habit, nausea and vomiting.   Genitourinary:  Negative for dysuria.   Neurological:  Negative for dizziness, headaches and light-headedness.   Psychiatric/Behavioral:  Negative for depression. The patient is not nervous/anxious.        Physical Exam  Very pleasant thin middle aged -American woman in no apparent CP or painful distress.   Well groomed   Neck: No JVD or HJR  CVS: HS 1,2. No added sounds  Resp: CTA bilaterally. Percussion note resonant   Abdomen: Flat, SNT, BS wnl  Extremities:No pedal oedema  Skin:warm and dry  CNS: AO x 4, no gross deficits       Results/Data    Test Name Result Flag Reference   Echocardiogram (Report)     [id number]    [facility], [address]  [place] [address]  Tel [phone] and Fax [phone]    TRANSTHORACIC ECHOCARDIOGRAM REPORT      Patient Name:   RONY AVERY Reading Physician:  [id number] Raciel Avendaño MD  Study Date:    3/21/2023   Referring Physician: JASON CASTILLO  MRN/PID:     [id number]   PCP:  Accession/Order#: [id number] Department Location: [facility]  YOB: 1970   Fellow:  Gender:      F       Nurse:  Admit Date:           Sonographer:     GUSTAVO Cox RDCS  Admission Status: Outpatient  Additional Staff:  Height:      165.10 cm   CC Report to:  Weight:      63.50 kg   Study Type:     Echocardiogram  BSA:       1.70 m2  Blood Pressure: 109 /70 mmHg    Diagnosis/ICD: I50.30-Unspecified diastolic (congestive) heart failure (CHF)  Indication:  HFpEF  Procedure/CPT: Echo Complete w Full [id number]    Patient History:  Smoker:      Current.  Pertinent History: NICM, HFpEF,.    Study Detail: The following Echo studies were performed: 2D, M-Mode, Doppler and  color flow.      PHYSICIAN INTERPRETATION:  Left Ventricle: The left ventricular systolic function is normal, with an estimated ejection fraction of 60%. The left ventricular cavity size is normal. Abnormal (paradoxical) septal motion, consistent with an intraventricular conduction delay. Spectral Doppler shows a normal pattern of left ventricular diastolic filling.  Left Atrium: The left atrium is normal in size.  Right Ventricle: The right ventricle is normal in size. There is normal right ventricular global systolic function.  Right Atrium: The right atrium is normal in size.  Aortic Valve: The aortic valve is probably trileaflet. There is trivial aortic valve regurgitation. The peak instantaneous gradient of the aortic valve is 5.7 mmHg.  Mitral Valve: The mitral valve is normal in structure. There is mild mitral valve regurgitation.  Tricuspid Valve: The tricuspid valve is structurally normal. There is trace tricuspid  regurgitation.  Pulmonic Valve: The pulmonic valve is not well visualized. There is trace pulmonic valve regurgitation.  Pericardium: There is a trivial pericardial effusion.  Aorta: The aortic root is normal.  Systemic Veins: The inferior vena cava appears to be of normal size.  In comparison to the previous echocardiogram(s): Compared with study from 2022, no significant change.      CONCLUSIONS:  1. Left ventricular systolic function is normal with a 60% estimated ejection fraction.    QUANTITATIVE DATA SUMMARY:  2D MEASUREMENTS:  Normal Ranges:  LAs:      3.42 cm  (2.7-4.0cm)  IVSd:     1.03 cm  (0.6-1.1cm)  LVPWd:     1.01 cm  (0.6-1.1cm)  LVIDd:     4.40 cm  (3.9-5.9cm)  LVIDs:     3.14 cm  LV Mass Index: 88.8 g/m2  LV % FS    28.5 %    LA VOLUME:  Normal Ranges:  LA Vol A4C:    36.6 ml  (22+/-6mL/m2)  LA Vol A2C:    38.0 ml  LA Vol BP:     39.6 ml  LA Vol Index A4C: 21.5 ml/m2  LA Vol Index A2C: 22.4 ml/m2  LA Vol Index BP:  23.3 ml/m2  LA Area A4C:    15.1 cm2  LA Area A2C:    14.5 cm2  LA Major Axis A4C: 5.3 cm  LA Major Axis A2C: 4.7 cm  LA Vol A4C:    33.9 ml  LA Vol A2C:    37.6 ml    AORTA MEASUREMENTS:  Normal Ranges:  Ao Sinus, d: 2.80 cm (2.1-3.5cm)    LV SYSTOLIC FUNCTION BY 2D PLANIMETRY (MOD):  Normal Ranges:  EF-A4C View: 45.7 % (>=55%)  EF-A2C View: 56.4 %  EF-Biplane: 52.4 %    LV DIASTOLIC FUNCTION:  Normal Ranges:  MV Peak E:   0.60 m/s  (0.7-1.2 m/s)  MV Peak A:   0.57 m/s  (0.42-0.7 m/s)  E/A Ratio:   1.05    (1.0-2.2)  MV e'      0.08 m/s  (>8.0)  MV lateral e'  0.12 m/s  MV medial e'  0.05 m/s  MV A Dur:    87.54 msec  E/e' Ratio:   7.02    (<8.0)  MV DT:     189 msec  (150-240 msec)  PulmV Sys Trae: 44.49 cm/s  PulmV Betancourt Trae: 40.46 cm/s  PulmV S/D Trae: 1.10    MITRAL VALVE:  Normal Ranges:  MV DT: 189 msec (150-240msec)    AORTIC VALVE:  Normal Ranges:  AoV Vmax:   1.20 m/s (<=1.7m/s)  AoV Peak P.7 mmHg (<20mmHg)  LVOT Max Trae: 0.97 m/s (<=1.1m/s)  LVOT VTI:   16.68 cm  LVOT  Diameter: 2.11 cm (1.8-2.4cm)  AoV Area,Vmax: 2.84 cm2 (2.5-4.5cm2)    RIGHT VENTRICLE:  RV 1 2.9 cm  RV 2 2.2 cm  RV 3 5.7 cm  RV s' 0.09 m/s    TRICUSPID VALVE/RVSP:  Normal Ranges:  IVC Diam: 1.20 cm    PULMONIC VALVE:  Normal Ranges:  PV Max Trae: 0.7 m/s (0.6-0.9m/s)  PV Max P.0 mmHg    Pulmonary Veins:  PulmV Betancourt Trae: 40.46 cm/s  PulmV S/D Trae: 1.10  PulmV Sys Trae: 44.49 cm/s         Lab Results   Component Value Date    WBC 13.1 (H) 2024    HGB 15.3 2024    HCT 46.6 (H) 2024    MCV 84 2024     2024       Chemistry    Lab Results   Component Value Date/Time     2024 0759    K 4.8 2024 0759     2024 0759    CO2 28 2024 0759    BUN 15 2024 0759    CREATININE 0.62 2024 0759    Lab Results   Component Value Date/Time    CALCIUM 10.5 2024 0759    ALKPHOS 71 2022 1524    ALKPHOS 69 2022 1524    AST 14 2022 1524    AST 17 2022 1524    ALT 14 2022 1524    ALT 17 2022 1524    BILITOT 0.2 2022 1524    BILITOT 0.2 2022 1524            IMPRESSION:    54 y.o.  who presents for advanced heart failure care. She has a past medical history significant for HFrEF/nonischemic biventricular cardiomyopathy discovered on 2021 admission for tachycardia. On TTE 2021 LVEF 15% LVIDD 5.8 cm with decreased RV systolic function; on cardiac MRI 2021 LVEF 15% RVEF 21%. On C 2021 she had normal coronaries with LVEDP 5 mmHg. Her other comorbidities include sickle cell trait, active smoking history, cocaine use, marijuana use. Since her heart failure diagnosis she has been abstinent from cocaine and marijuana, and continues to smoke cigarettes.  Ms. Murguia was initiated on heart failure pharmacotherapy and has had complete normalization of her biventricular systolic function; on TTE 2021 LVEF 60-65% LVIDD 4.4 cm with normal right ventricular systolic function, RVSP 26 mmHg. TTE  1/2022 LVEF 60-65%. repeat TTE 3/2023 shows normal biV systolic function with LVEF 60%  She has been nonadherent with prescribed heart failure GDMT for the last week months or so.  She does give a history of intermittent, infrequent exertional dyspnea.    NYHA Functional Class: 1-2   ACC/AHA Stage C heart failure  Volume status: Euvolaemic  Perfusion status: warm to touch  Etiology: Nonischemic    PLAN:    #HFimprovedEF    -Nonadherence with medications recently with some exertional dyspnea.  Will check TTE and labs before next visit  -Full medication adherence encouraged  - Device therapy not currently indicated  -Heart failure lifestyle modifications discussed and Qs answered.   -Advanced therapies are currently not indicated  - We again discussed the need for continued HF GDMT lifelong    -Medication optimisation:  BB: continue Carvedilol 25 mg bid   RAASi: Continue ARNI to 97/103 mg bid   AA: Hold Spironolactone 12.5 mg qd, stopped for ~ 2 months and NYHA FC 1-2. we discussed that we may need to restart this in future  SGLT2i: continue Empagliflozin 10 mg qd. Previously on this medication and after she got a yeast infection her primary care physician stopped it. She has been tolerating well  Furosemide 20 mg-continue prn use    #Health maintenance:  - Continue NRT ( gum and lozenges) had rash with NRT patch  - Working toward to quit smoking, has been again referred to smoking cessation team    Follow-up appointment in 6 months    This note was transcribed using the Dragon Dictation system. There may be grammatical, punctuation, or verbiage errors that can occur with voice recognition programs.     Diane Graham MD PhD

## 2024-10-17 NOTE — PATIENT INSTRUCTIONS
Thank you for coming in today. If you have any questions or concerns, you may call the Heart Failure Office at [phone] option [phone], or [phone].  You may also contact our heart failure nursing team via email on [email or web address].    For quicker results set-up your  [facility] account to receive results and other correspondence directly to your phone.    Please bring all your pills/medications to your Cardiology appointments.    **  - No new medication changes today. Please take ALL uyout heart medications as prescribed     -We will renew your heart medications today    - Please make the following medication changes:  1. START nicotine patches 21 mg/hr    2. START nicotine lozenges     - You have set your smoking QUIT date as Sunday, October 20, 2024. You can do it!    - Please have the following tests done:  1.Blood tests  (RFP, BNP, CBC, TSH)    2.  Echocardiogram, CALL  Tel [phone] to schedule this test    You will be referred to the following teams, CALL  [phone] to schedule your appointments with:  1.  Smoking cessation team    - Please make an appointment to be seen in  6 months

## 2024-10-21 ENCOUNTER — HOSPITAL ENCOUNTER (OUTPATIENT)
Dept: CARDIOLOGY | Facility: HOSPITAL | Age: 54
Discharge: HOME | End: 2024-10-21
Payer: COMMERCIAL

## 2024-10-21 DIAGNOSIS — I50.30 HEART FAILURE WITH PRESERVED EJECTION FRACTION, UNSPECIFIED HF CHRONICITY: ICD-10-CM

## 2024-10-21 DIAGNOSIS — I42.8 NON-ISCHEMIC CARDIOMYOPATHY (MULTI): ICD-10-CM

## 2024-10-21 DIAGNOSIS — I50.40 UNSPECIFIED COMBINED SYSTOLIC (CONGESTIVE) AND DIASTOLIC (CONGESTIVE) HEART FAILURE: ICD-10-CM

## 2024-10-21 LAB
AORTIC VALVE PEAK VELOCITY: 1.11 M/S
AV PEAK GRADIENT: 4.9 MMHG
AVA (PEAK VEL): 2.58 CM2
EJECTION FRACTION APICAL 4 CHAMBER: 40.5
EJECTION FRACTION: 55 %
LEFT ATRIUM VOLUME AREA LENGTH INDEX BSA: 25.9 ML/M2
LEFT VENTRICLE INTERNAL DIMENSION DIASTOLE: 5.31 CM (ref 3.5–6)
LEFT VENTRICULAR OUTFLOW TRACT DIAMETER: 2.08 CM
MITRAL VALVE E/A RATIO: 0.87
RIGHT VENTRICLE FREE WALL PEAK S': 8 CM/S

## 2024-10-21 PROCEDURE — 93306 TTE W/DOPPLER COMPLETE: CPT | Performed by: INTERNAL MEDICINE

## 2024-10-21 PROCEDURE — 93306 TTE W/DOPPLER COMPLETE: CPT

## 2024-10-21 PROCEDURE — 2500000004 HC RX 250 GENERAL PHARMACY W/ HCPCS (ALT 636 FOR OP/ED): Performed by: STUDENT IN AN ORGANIZED HEALTH CARE EDUCATION/TRAINING PROGRAM

## 2024-10-28 ENCOUNTER — APPOINTMENT (OUTPATIENT)
Dept: CARDIOLOGY | Facility: HOSPITAL | Age: 54
End: 2024-10-28
Payer: COMMERCIAL

## 2024-11-07 DIAGNOSIS — I42.8 NON-ISCHEMIC CARDIOMYOPATHY (MULTI): ICD-10-CM

## 2024-11-07 DIAGNOSIS — I50.30 HEART FAILURE WITH PRESERVED EJECTION FRACTION, UNSPECIFIED HF CHRONICITY: ICD-10-CM

## 2024-11-07 PROCEDURE — RXMED WILLOW AMBULATORY MEDICATION CHARGE

## 2024-11-07 RX ORDER — ASPIRIN/CALCIUM CARB/MAGNESIUM 325 MG
4 TABLET ORAL EVERY 2 HOUR PRN
Qty: 72 LOZENGE | Refills: 0 | Status: SHIPPED | OUTPATIENT
Start: 2024-11-07 | End: 2024-12-07

## 2024-11-08 ENCOUNTER — PHARMACY VISIT (OUTPATIENT)
Dept: PHARMACY | Facility: CLINIC | Age: 54
End: 2024-11-08
Payer: COMMERCIAL

## 2024-12-03 PROCEDURE — RXMED WILLOW AMBULATORY MEDICATION CHARGE

## 2024-12-04 ENCOUNTER — PHARMACY VISIT (OUTPATIENT)
Dept: PHARMACY | Facility: CLINIC | Age: 54
End: 2024-12-04
Payer: COMMERCIAL

## 2024-12-04 PROCEDURE — RXMED WILLOW AMBULATORY MEDICATION CHARGE

## 2025-01-23 ENCOUNTER — APPOINTMENT (OUTPATIENT)
Dept: PRIMARY CARE | Facility: CLINIC | Age: 55
End: 2025-01-23
Payer: COMMERCIAL

## 2025-01-27 PROCEDURE — RXMED WILLOW AMBULATORY MEDICATION CHARGE

## 2025-01-28 ENCOUNTER — PHARMACY VISIT (OUTPATIENT)
Dept: PHARMACY | Facility: CLINIC | Age: 55
End: 2025-01-28
Payer: COMMERCIAL

## 2025-03-06 ENCOUNTER — PHARMACY VISIT (OUTPATIENT)
Dept: PHARMACY | Facility: CLINIC | Age: 55
End: 2025-03-06
Payer: COMMERCIAL

## 2025-03-06 PROCEDURE — RXMED WILLOW AMBULATORY MEDICATION CHARGE

## 2025-04-11 PROCEDURE — RXMED WILLOW AMBULATORY MEDICATION CHARGE

## 2025-04-14 ENCOUNTER — PHARMACY VISIT (OUTPATIENT)
Dept: PHARMACY | Facility: CLINIC | Age: 55
End: 2025-04-14
Payer: COMMERCIAL

## 2025-04-17 ENCOUNTER — APPOINTMENT (OUTPATIENT)
Dept: CARDIOLOGY | Facility: HOSPITAL | Age: 55
End: 2025-04-17
Payer: COMMERCIAL

## 2025-05-09 ENCOUNTER — APPOINTMENT (OUTPATIENT)
Dept: CARDIOLOGY | Facility: HOSPITAL | Age: 55
End: 2025-05-09
Payer: COMMERCIAL

## 2025-06-03 ENCOUNTER — APPOINTMENT (OUTPATIENT)
Dept: RADIOLOGY | Facility: HOSPITAL | Age: 55
End: 2025-06-03
Payer: COMMERCIAL

## 2025-06-03 ENCOUNTER — HOSPITAL ENCOUNTER (OUTPATIENT)
Dept: RADIOLOGY | Facility: HOSPITAL | Age: 55
Discharge: HOME | End: 2025-06-03
Payer: COMMERCIAL

## 2025-06-03 VITALS — BODY MASS INDEX: 23.92 KG/M2 | WEIGHT: 130 LBS | HEIGHT: 62 IN

## 2025-06-03 DIAGNOSIS — Z12.31 SCREENING MAMMOGRAM FOR BREAST CANCER: ICD-10-CM

## 2025-06-03 PROCEDURE — 77067 SCR MAMMO BI INCL CAD: CPT

## 2025-06-03 PROCEDURE — 77067 SCR MAMMO BI INCL CAD: CPT | Performed by: RADIOLOGY

## 2025-06-03 PROCEDURE — 77063 BREAST TOMOSYNTHESIS BI: CPT | Performed by: RADIOLOGY

## 2025-06-13 PROCEDURE — RXMED WILLOW AMBULATORY MEDICATION CHARGE

## 2025-06-16 ENCOUNTER — PHARMACY VISIT (OUTPATIENT)
Dept: PHARMACY | Facility: CLINIC | Age: 55
End: 2025-06-16
Payer: COMMERCIAL

## 2025-06-25 ENCOUNTER — APPOINTMENT (OUTPATIENT)
Dept: PRIMARY CARE | Facility: CLINIC | Age: 55
End: 2025-06-25
Payer: COMMERCIAL

## 2025-08-21 ENCOUNTER — APPOINTMENT (OUTPATIENT)
Dept: OPHTHALMOLOGY | Facility: CLINIC | Age: 55
End: 2025-08-21
Payer: COMMERCIAL

## 2025-10-09 ENCOUNTER — APPOINTMENT (OUTPATIENT)
Dept: OPHTHALMOLOGY | Facility: CLINIC | Age: 55
End: 2025-10-09
Payer: COMMERCIAL